# Patient Record
Sex: MALE | Race: WHITE | Employment: OTHER | URBAN - METROPOLITAN AREA
[De-identification: names, ages, dates, MRNs, and addresses within clinical notes are randomized per-mention and may not be internally consistent; named-entity substitution may affect disease eponyms.]

---

## 2017-01-31 ENCOUNTER — OFFICE VISIT (OUTPATIENT)
Dept: FAMILY MEDICINE CLINIC | Facility: CLINIC | Age: 52
End: 2017-01-31

## 2017-01-31 VITALS
HEART RATE: 76 BPM | WEIGHT: 225 LBS | BODY MASS INDEX: 31.5 KG/M2 | TEMPERATURE: 98 F | OXYGEN SATURATION: 98 % | RESPIRATION RATE: 16 BRPM | SYSTOLIC BLOOD PRESSURE: 122 MMHG | DIASTOLIC BLOOD PRESSURE: 80 MMHG | HEIGHT: 71 IN

## 2017-01-31 DIAGNOSIS — J01.40 ACUTE PANSINUSITIS, RECURRENCE NOT SPECIFIED: Primary | ICD-10-CM

## 2017-01-31 DIAGNOSIS — R09.89 CHEST CONGESTION: ICD-10-CM

## 2017-01-31 PROCEDURE — 99202 OFFICE O/P NEW SF 15 MIN: CPT | Performed by: NURSE PRACTITIONER

## 2017-01-31 RX ORDER — AMOXICILLIN AND CLAVULANATE POTASSIUM 875; 125 MG/1; MG/1
1 TABLET, FILM COATED ORAL 2 TIMES DAILY
Qty: 20 TABLET | Refills: 0 | Status: SHIPPED | OUTPATIENT
Start: 2017-01-31 | End: 2017-02-10

## 2017-01-31 RX ORDER — DOXEPIN HYDROCHLORIDE 50 MG/1
1 CAPSULE ORAL DAILY
COMMUNITY

## 2017-01-31 RX ORDER — OMEGA-3-ACID ETHYL ESTERS 1 G/1
1 CAPSULE, LIQUID FILLED ORAL DAILY
COMMUNITY

## 2017-01-31 RX ORDER — DIMENHYDRINATE 50 MG
TABLET ORAL
COMMUNITY

## 2017-01-31 NOTE — PROGRESS NOTES
CHIEF COMPLAINT:   Patient presents with:  Sinus Problem: sinus pressure, fever, sinus drainage X 5 days      HPI:   Josselyn Nicole is a 46year old male who presents for sinus congestion for  5  days. Symptoms have been worsening since onset.  Sinus conge LUNGS: Few scattered rhonchi that cleared with coughing. Few scattered wheezes that cleared with coughing. No crackles. No diminished breath sounds. Breathing is non labored. No shortness of breath or any problems breathing.  Talking with no shortness of br -   Take antibiotics with food. -   Complete entire course of antibiotics       It is recommended to take yogurt or probiotics while taking an antibiotic. Examples include:  · Yogurt: eat 4-8 oz twice daily.   Choose a product with the [employer] · Drink plenty of water, hot tea, and other liquids. This may help thin mucus. It also may promote sinus drainage. · Heat may help soothe painful areas of the face. Use a towel soaked in hot water.  Or,  the shower and direct the hot spray onto you · Unusual drowsiness or confusion  · Swelling of the forehead or eyelids  · Vision problems, including blurred or double vision  · Fever of 100.4ºF (38ºC) or higher, or as directed by your healthcare provider  · Seizure  · Breathing problems  · Symptoms no

## (undated) NOTE — MR AVS SNAPSHOT
[facility] [address] [place] [id number] [facility] [id number] [phone]               Thank you for choosing us for your health care visit with EL Nicolas.   We are glad to serve you and happy to provide you with this summary of your GO to the ER if any facial or periorbital swelling develops     The patient indicates understanding of treatment plan and agrees to plan.     · If you develop a rash, hives, itching, throat tightness, or shortness of breath while on the antibiotic or shortl phenylephrine or oxymetazoline. First blow the nose gently. Then use the spray. Do not use these medicines more often than directed on the label or symptoms may get worse. You may also use tablets containing pseudoephedrine.  Avoid products that combine ing Follow Up with Our Office     Return if symptoms worsen or fail to improve.       Allergies as of Jan 31, 2017     No Known Allergies                Today's Vital Signs     BP Pulse Temp Height Weight BMI    122/80 mmHg 76 98.1 °F (36.7 °C) (Oral) 71\" 225 Healthy Diet and Regular Exercise  [employer] [address] [place], [place] [address] [facility] for making healthy food choices  -   Enjoy your food, but eat less. Fully enjoy your food when eating. Don’t eat while distracted and slow down. Avoid over sized portions.    Stephanie Marcelino